# Patient Record
Sex: MALE | Race: WHITE | Employment: FULL TIME | URBAN - METROPOLITAN AREA
[De-identification: names, ages, dates, MRNs, and addresses within clinical notes are randomized per-mention and may not be internally consistent; named-entity substitution may affect disease eponyms.]

---

## 2019-03-30 ENCOUNTER — HOSPITAL ENCOUNTER (EMERGENCY)
Age: 49
Discharge: HOME | End: 2019-03-30
Payer: COMMERCIAL

## 2019-03-30 VITALS
OXYGEN SATURATION: 98 % | WEIGHT: 280 LBS | TEMPERATURE: 97.8 F | DIASTOLIC BLOOD PRESSURE: 90 MMHG | RESPIRATION RATE: 20 BRPM | HEART RATE: 67 BPM | SYSTOLIC BLOOD PRESSURE: 155 MMHG

## 2019-03-30 DIAGNOSIS — R42 VERTIGO: Primary | ICD-10-CM

## 2019-03-30 DIAGNOSIS — M43.6 TORTICOLLIS: ICD-10-CM

## 2019-03-30 DIAGNOSIS — H93.8X2 EAR PRESSURE, LEFT: ICD-10-CM

## 2019-03-30 PROCEDURE — 96372 THER/PROPH/DIAG INJ SC/IM: CPT

## 2019-03-30 PROCEDURE — 6360000002 HC RX W HCPCS: Performed by: NURSE PRACTITIONER

## 2019-03-30 PROCEDURE — 99212 OFFICE O/P EST SF 10 MIN: CPT

## 2019-03-30 RX ORDER — LORATADINE 10 MG/1
10 TABLET ORAL DAILY
Qty: 20 TABLET | Refills: 0 | Status: SHIPPED | OUTPATIENT
Start: 2019-03-30 | End: 2021-05-10 | Stop reason: ALTCHOICE

## 2019-03-30 RX ORDER — MECLIZINE HYDROCHLORIDE 25 MG/1
25 TABLET ORAL 3 TIMES DAILY PRN
Qty: 30 TABLET | Refills: 0 | Status: SHIPPED | OUTPATIENT
Start: 2019-03-30 | End: 2019-04-09

## 2019-03-30 RX ORDER — METHOCARBAMOL 500 MG/1
500 TABLET, FILM COATED ORAL 4 TIMES DAILY
Qty: 40 TABLET | Refills: 0 | Status: SHIPPED | OUTPATIENT
Start: 2019-03-30 | End: 2019-04-09

## 2019-03-30 RX ORDER — NAPROXEN 500 MG/1
500 TABLET ORAL 2 TIMES DAILY PRN
Qty: 28 TABLET | Refills: 0 | Status: SHIPPED | OUTPATIENT
Start: 2019-03-30 | End: 2021-05-10 | Stop reason: ALTCHOICE

## 2019-03-30 RX ORDER — FLUTICASONE PROPIONATE 50 MCG
1 SPRAY, SUSPENSION (ML) NASAL DAILY
Qty: 1 BOTTLE | Refills: 0 | Status: SHIPPED | OUTPATIENT
Start: 2019-03-30 | End: 2021-05-10 | Stop reason: ALTCHOICE

## 2019-03-30 RX ORDER — KETOROLAC TROMETHAMINE 30 MG/ML
30 INJECTION, SOLUTION INTRAMUSCULAR; INTRAVENOUS ONCE
Status: COMPLETED | OUTPATIENT
Start: 2019-03-30 | End: 2019-03-30

## 2019-03-30 RX ADMIN — KETOROLAC TROMETHAMINE 30 MG: 30 INJECTION, SOLUTION INTRAMUSCULAR at 15:27

## 2019-03-30 ASSESSMENT — PAIN DESCRIPTION - LOCATION: LOCATION: NECK

## 2019-03-30 ASSESSMENT — PAIN SCALES - GENERAL
PAINLEVEL_OUTOF10: 7
PAINLEVEL_OUTOF10: 7

## 2019-03-30 ASSESSMENT — PAIN DESCRIPTION - DESCRIPTORS: DESCRIPTORS: TIGHTNESS

## 2019-03-30 ASSESSMENT — PAIN DESCRIPTION - PAIN TYPE: TYPE: ACUTE PAIN

## 2019-03-30 ASSESSMENT — PAIN DESCRIPTION - ONSET: ONSET: GRADUAL

## 2019-03-30 ASSESSMENT — PAIN DESCRIPTION - PROGRESSION: CLINICAL_PROGRESSION: NOT CHANGED

## 2019-03-30 ASSESSMENT — PAIN DESCRIPTION - FREQUENCY: FREQUENCY: CONTINUOUS

## 2021-05-10 ENCOUNTER — APPOINTMENT (OUTPATIENT)
Dept: GENERAL RADIOLOGY | Age: 51
End: 2021-05-10
Payer: COMMERCIAL

## 2021-05-10 ENCOUNTER — HOSPITAL ENCOUNTER (EMERGENCY)
Age: 51
Discharge: HOME | End: 2021-05-10
Attending: EMERGENCY MEDICINE
Payer: COMMERCIAL

## 2021-05-10 VITALS
HEIGHT: 71 IN | RESPIRATION RATE: 18 BRPM | DIASTOLIC BLOOD PRESSURE: 89 MMHG | OXYGEN SATURATION: 97 % | BODY MASS INDEX: 39.2 KG/M2 | WEIGHT: 280 LBS | SYSTOLIC BLOOD PRESSURE: 138 MMHG | TEMPERATURE: 98.6 F | HEART RATE: 76 BPM

## 2021-05-10 DIAGNOSIS — S49.92XA INJURY OF LEFT SHOULDER, INITIAL ENCOUNTER: Primary | ICD-10-CM

## 2021-05-10 PROCEDURE — 6360000002 HC RX W HCPCS: Performed by: EMERGENCY MEDICINE

## 2021-05-10 PROCEDURE — 96372 THER/PROPH/DIAG INJ SC/IM: CPT

## 2021-05-10 PROCEDURE — 99285 EMERGENCY DEPT VISIT HI MDM: CPT

## 2021-05-10 PROCEDURE — 73030 X-RAY EXAM OF SHOULDER: CPT

## 2021-05-10 RX ORDER — NAPROXEN 500 MG/1
500 TABLET ORAL 2 TIMES DAILY PRN
Qty: 60 TABLET | Refills: 0 | Status: SHIPPED | OUTPATIENT
Start: 2021-05-10

## 2021-05-10 RX ORDER — FENTANYL CITRATE 50 UG/ML
100 INJECTION, SOLUTION INTRAMUSCULAR; INTRAVENOUS ONCE
Status: COMPLETED | OUTPATIENT
Start: 2021-05-10 | End: 2021-05-10

## 2021-05-10 RX ADMIN — FENTANYL CITRATE 100 MCG: 50 INJECTION, SOLUTION INTRAMUSCULAR; INTRAVENOUS at 08:17

## 2021-05-10 ASSESSMENT — PAIN SCALES - GENERAL
PAINLEVEL_OUTOF10: 9
PAINLEVEL_OUTOF10: 9

## 2021-05-10 ASSESSMENT — PAIN DESCRIPTION - FREQUENCY
FREQUENCY: CONTINUOUS
FREQUENCY: CONTINUOUS

## 2021-05-10 ASSESSMENT — PAIN DESCRIPTION - ORIENTATION
ORIENTATION: LEFT
ORIENTATION: LEFT

## 2021-05-10 ASSESSMENT — ENCOUNTER SYMPTOMS
BACK PAIN: 0
VOMITING: 0
ABDOMINAL PAIN: 0
SHORTNESS OF BREATH: 0
NAUSEA: 0

## 2021-05-10 ASSESSMENT — PAIN DESCRIPTION - LOCATION: LOCATION: SHOULDER

## 2021-05-10 NOTE — ED NOTES
Extra large arm sling applied to left shoulder, tolerated procedure without  Difficulty.       Jayleen Cummings, KURT  05/10/21 9287

## 2021-05-10 NOTE — ED PROVIDER NOTES
HPI   Patient is a 44 y.o. male with a past medical history of nothing, presenting to the Emergency Department for left shoulder pain. History obtained by patient. Symptoms are mild to moderate in severity and distant since onset. They are improved by nothing and worsened by movement. Patient states he slipped on water on his garage yesterday and landed on his left shoulder. He has had pain to the left shoulder since the fall. He attempted ibuprofen as well as heat at home with minimal relief. He states not improving today. He has never hurt the shoulder before. Denies any numbness or tingling. He has no elbow or wrist pain. He states is worse when he attempts to move the shoulder in any direction. He wanted to go to work today but states he cannot move his left shoulder which is what prompted evaluation. He has never seen orthopedics before. He states he did hit the front of his face on the ground but did not lose consciousness. He is on no blood thinning medication. He denies any blurry vision, changes in vision, numbness, tingling or weakness. Review of Systems   Constitutional: Negative for chills and fever. Respiratory: Negative for shortness of breath. Cardiovascular: Negative for chest pain. Gastrointestinal: Negative for abdominal pain, nausea and vomiting. Musculoskeletal: Positive for arthralgias and myalgias. Negative for back pain and neck stiffness. Skin: Positive for wound. Negative for rash. Neurological: Negative for numbness and headaches. All other systems reviewed and are negative. Physical Exam  Vitals signs and nursing note reviewed. Constitutional:       General: He is not in acute distress. Appearance: He is well-developed. He is not ill-appearing. HENT:      Head: Normocephalic. Comments: Small Abrasion of the anterior nasal bridge  Eyes:      Extraocular Movements: Extraocular movements intact.    Neck:      Musculoskeletal: Normal range of motion and neck supple. No muscular tenderness. Cardiovascular:      Rate and Rhythm: Normal rate and regular rhythm. Pulses: Normal pulses. Heart sounds: Normal heart sounds. No murmur. Pulmonary:      Effort: Pulmonary effort is normal. No respiratory distress. Breath sounds: Normal breath sounds. No wheezing or rales. Abdominal:      Palpations: Abdomen is soft. Tenderness: There is no abdominal tenderness. There is no guarding or rebound. Musculoskeletal:      Comments: Pain with left shoulder range of motion. With external rotation as well as abduction. No pain with adduction or internal rotation. No palpable step-offs or deformities. Minimal tenderness to palpation. Temple's are soft and compressible. Palpable radial pulses bilaterally. Neurovascularly in tact   Skin:     General: Skin is warm and dry. Neurological:      Mental Status: He is alert and oriented to person, place, and time. Cranial Nerves: No cranial nerve deficit. Coordination: Coordination normal.          Procedures     MDM   Patient presented to the Emergency Department for shoulder pain. They are clinically stable, vital signs stable, non toxic appearing. He is neurovascularly intact. Physical exam concerning for possible rotator cuff pathology. He has pain with range of motion testing. X-ray performed no evidence of acute fracture or dislocation. Patient's physical exam, rotator pathology is suspected. He was given pain medication in the department. He will be instructed on supportive care therapy at home as well as referral with orthopedics. Strict return precautions were discussed including but not limited too numbness, tingling, worsening pain, new or worsening symtpoms. They verbalized understanding and were agreeable with the plan. All questions were answered and patient was discharged.             --------------------------------------------- PAST HISTORY ---------------------------------------------  Past Medical History:  has no past medical history on file. Past Surgical History:  has a past surgical history that includes hernia repair. Social History:  reports that he has never smoked. He has never used smokeless tobacco. He reports current alcohol use. He reports that he does not use drugs. Family History: family history is not on file. The patients home medications have been reviewed. Allergies: Patient has no known allergies. -------------------------------------------------- RESULTS -------------------------------------------------  Labs:  No results found for this visit on 05/10/21. Radiology:  XR SHOULDER LEFT (MIN 2 VIEWS)   Final Result   No acute abnormality.               ------------------------- NURSING NOTES AND VITALS REVIEWED ---------------------------  Date / Time Roomed:  5/10/2021  7:44 AM  ED Bed Assignment:  05/05    The nursing notes within the ED encounter and vital signs as below have been reviewed. /89   Pulse 76   Temp 98.6 °F (37 °C) (Oral)   Resp 18   Ht 5' 11\" (1.803 m)   Wt 280 lb (127 kg)   SpO2 97%   BMI 39.05 kg/m²   Oxygen Saturation Interpretation: Normal      ------------------------------------------ PROGRESS NOTES ------------------------------------------  ED COURSE MEDICATIONS:                Medications   fentaNYL (SUBLIMAZE) injection 100 mcg (100 mcg Intramuscular Given 5/10/21 0808)       I have spoken with the patient and discussed todays results, in addition to providing specific details for the plan of care and counseling regarding the diagnosis and prognosis. Their questions are answered at this time and they are agreeable with the plan. I discussed at length with them reasons for immediate return here for re evaluation. They will followup with primary care by calling their office tomorrow.       --------------------------------- ADDITIONAL PROVIDER NOTES ---------------------------------  At this time the patient is without objective evidence of an acute process requiring hospitalization or inpatient management. They have remained hemodynamically stable throughout their entire ED visit and are stable for discharge with outpatient follow-up. The plan has been discussed in detail and they are aware of the specific conditions for emergent return, as well as the importance of follow-up. New Prescriptions    NAPROXEN (NAPROSYN) 500 MG TABLET    Take 1 tablet by mouth 2 times daily as needed for Pain       Diagnosis:  1. Injury of left shoulder, initial encounter        Disposition:  Patient's disposition: Discharge to home  Patient's condition is stable.         [address] [place] [address] DO Jackelin  Resident  05/10/21 5773

## 2023-05-31 ENCOUNTER — HOSPITAL ENCOUNTER (EMERGENCY)
Age: 53
Discharge: HOME | End: 2023-05-31
Payer: COMMERCIAL

## 2023-05-31 VITALS
SYSTOLIC BLOOD PRESSURE: 173 MMHG | WEIGHT: 270 LBS | BODY MASS INDEX: 37.8 KG/M2 | OXYGEN SATURATION: 98 % | RESPIRATION RATE: 18 BRPM | HEART RATE: 71 BPM | TEMPERATURE: 98.6 F | HEIGHT: 71 IN | DIASTOLIC BLOOD PRESSURE: 95 MMHG

## 2023-05-31 DIAGNOSIS — S05.02XA ABRASION OF LEFT CORNEA, INITIAL ENCOUNTER: Primary | ICD-10-CM

## 2023-05-31 PROCEDURE — 99211 OFF/OP EST MAY X REQ PHY/QHP: CPT

## 2023-05-31 RX ORDER — PURIFIED WATER 986 MG/ML
1 SOLUTION OPHTHALMIC PRN
Status: DISCONTINUED | OUTPATIENT
Start: 2023-05-31 | End: 2023-05-31 | Stop reason: HOSPADM

## 2023-05-31 RX ORDER — ERYTHROMYCIN 5 MG/G
OINTMENT OPHTHALMIC
Qty: 3.5 G | Refills: 0 | Status: SHIPPED | OUTPATIENT
Start: 2023-05-31 | End: 2023-06-10

## 2023-05-31 RX ORDER — TETRACAINE HYDROCHLORIDE 5 MG/ML
2 SOLUTION OPHTHALMIC ONCE
Status: DISCONTINUED | OUTPATIENT
Start: 2023-05-31 | End: 2023-05-31 | Stop reason: HOSPADM

## 2023-05-31 NOTE — ED PROVIDER NOTES
[address] ENCOUNTER        Pt Name: Austin Martinez  MRN: [id number]  [place] 1970  Date of evaluation: 5/31/2023  Provider: POLINA Reilly CNP  PCP: Jonatan Rutherford MD  Note Started: 8:51 AM EDT 5/31/23    CHIEF COMPLAINT       Chief Complaint   Patient presents with    Foreign Body in [address] [place] [address]: 1 or more Elements   History From: patient    Limitations to history : None    Austin Martinez is a 45 y.o. male who presents for evaluation. He said that he was putting a new countertop with his son on Monday which was 3 days ago and is needing something to have gotten to his he said they were cutting countertops and metal and things and he is not sure what time he had pain ever since he said his pain seem to be getting worse. He is denies any injury to his eye other than the possibility of a foreign body. The right eye is fine. Nursing Notes were all reviewed and agreed with or any disagreements were addressed in the HPI. REVIEW OF SYSTEMS :      Review of Systems    Positives and Pertinent negatives as per HPI. SURGICAL HISTORY     Past Surgical History:   Procedure Laterality Date    HERNIA REPAIR         CURRENTMEDICATIONS       Discharge Medication List as of 5/31/2023  8:32 AM        CONTINUE these medications which have NOT CHANGED    Details   naproxen (NAPROSYN) 500 MG tablet Take 1 tablet by mouth 2 times daily as needed for Pain, Disp-60 tablet, R-0Print             ALLERGIES     Patient has no known allergies. FAMILYHISTORY     History reviewed. No pertinent family history.      SOCIAL HISTORY       Social History     Tobacco Use    Smoking status: Never    Smokeless tobacco: Never   Vaping Use    Vaping Use: Never used   Substance Use Topics    Alcohol use: Yes     Comment: socially    Drug use: No       SCREENINGS                                  PHYSICAL EXAM  1 or more Elements     ED Triage

## 2025-04-06 ENCOUNTER — HOSPITAL ENCOUNTER (EMERGENCY)
Age: 55
Discharge: ANOTHER HOSPITAL | End: 2025-04-06
Attending: EMERGENCY MEDICINE
Payer: COMMERCIAL

## 2025-04-06 ENCOUNTER — HOSPITAL ENCOUNTER (INPATIENT)
Age: 55
LOS: 1 days | Discharge: HOME | DRG: 086 | End: 2025-04-07
Attending: EMERGENCY MEDICINE | Admitting: SURGERY
Payer: COMMERCIAL

## 2025-04-06 ENCOUNTER — APPOINTMENT (OUTPATIENT)
Dept: CT IMAGING | Age: 55
End: 2025-04-06
Payer: COMMERCIAL

## 2025-04-06 ENCOUNTER — APPOINTMENT (OUTPATIENT)
Dept: GENERAL RADIOLOGY | Age: 55
DRG: 086 | End: 2025-04-06
Payer: COMMERCIAL

## 2025-04-06 VITALS
RESPIRATION RATE: 16 BRPM | HEART RATE: 73 BPM | DIASTOLIC BLOOD PRESSURE: 72 MMHG | BODY MASS INDEX: 37.66 KG/M2 | OXYGEN SATURATION: 94 % | TEMPERATURE: 97 F | WEIGHT: 270 LBS | SYSTOLIC BLOOD PRESSURE: 127 MMHG

## 2025-04-06 DIAGNOSIS — S16.1XXA STRAIN OF NECK MUSCLE, INITIAL ENCOUNTER: ICD-10-CM

## 2025-04-06 DIAGNOSIS — I62.9 INTRACRANIAL BLEED (HCC): Primary | ICD-10-CM

## 2025-04-06 DIAGNOSIS — S06.5XAA SUBDURAL HEMATOMA (HCC): Primary | ICD-10-CM

## 2025-04-06 DIAGNOSIS — I61.9 INTRAPARENCHYMAL HEMORRHAGE OF BRAIN (HCC): ICD-10-CM

## 2025-04-06 LAB
ALBUMIN SERPL-MCNC: 4.1 G/DL (ref 3.5–5.2)
ALP SERPL-CCNC: 61 U/L (ref 40–129)
ALT SERPL-CCNC: 26 U/L (ref 0–40)
ANION GAP SERPL CALCULATED.3IONS-SCNC: 15 MMOL/L (ref 7–16)
AST SERPL-CCNC: 18 U/L (ref 0–39)
BASOPHILS # BLD: 0.07 K/UL (ref 0–0.2)
BASOPHILS NFR BLD: 1 % (ref 0–2)
BILIRUB SERPL-MCNC: 0.2 MG/DL (ref 0–1.2)
BUN SERPL-MCNC: 13 MG/DL (ref 6–20)
CALCIUM SERPL-MCNC: 8.9 MG/DL (ref 8.6–10.2)
CHLORIDE SERPL-SCNC: 94 MMOL/L (ref 98–107)
CO2 SERPL-SCNC: 21 MMOL/L (ref 22–29)
CREAT SERPL-MCNC: 0.9 MG/DL (ref 0.7–1.2)
EOSINOPHIL # BLD: 0.1 K/UL (ref 0.05–0.5)
EOSINOPHILS RELATIVE PERCENT: 1 % (ref 0–6)
ERYTHROCYTE [DISTWIDTH] IN BLOOD BY AUTOMATED COUNT: 14.2 % (ref 11.5–15)
GFR, ESTIMATED: >90 ML/MIN/1.73M2
GLUCOSE SERPL-MCNC: 164 MG/DL (ref 74–99)
HCT VFR BLD AUTO: 43.8 % (ref 37–54)
HGB BLD-MCNC: 14.9 G/DL (ref 12.5–16.5)
IMM GRANULOCYTES # BLD AUTO: 0.06 K/UL (ref 0–0.58)
IMM GRANULOCYTES NFR BLD: 1 % (ref 0–5)
INR PPP: 1
LYMPHOCYTES NFR BLD: 2.84 K/UL (ref 1.5–4)
LYMPHOCYTES RELATIVE PERCENT: 32 % (ref 20–42)
MCH RBC QN AUTO: 30.7 PG (ref 26–35)
MCHC RBC AUTO-ENTMCNC: 34 G/DL (ref 32–34.5)
MCV RBC AUTO: 90.3 FL (ref 80–99.9)
MONOCYTES NFR BLD: 0.58 K/UL (ref 0.1–0.95)
MONOCYTES NFR BLD: 7 % (ref 2–12)
NEUTROPHILS NFR BLD: 58 % (ref 43–80)
NEUTS SEG NFR BLD: 5.12 K/UL (ref 1.8–7.3)
PARTIAL THROMBOPLASTIN TIME: 33.5 SEC (ref 24.5–35.1)
PLATELET # BLD AUTO: 239 K/UL (ref 130–450)
PMV BLD AUTO: 10 FL (ref 7–12)
POTASSIUM SERPL-SCNC: 4.4 MMOL/L (ref 3.5–5)
PROT SERPL-MCNC: 7.1 G/DL (ref 6.4–8.3)
PROTHROMBIN TIME: 10.7 SEC (ref 9.3–12.4)
RBC # BLD AUTO: 4.85 M/UL (ref 3.8–5.8)
SODIUM SERPL-SCNC: 130 MMOL/L (ref 132–146)
WBC OTHER # BLD: 8.8 K/UL (ref 4.5–11.5)

## 2025-04-06 PROCEDURE — 90471 IMMUNIZATION ADMIN: CPT | Performed by: PHYSICIAN ASSISTANT

## 2025-04-06 PROCEDURE — 80179 DRUG ASSAY SALICYLATE: CPT

## 2025-04-06 PROCEDURE — 80307 DRUG TEST PRSMV CHEM ANLYZR: CPT

## 2025-04-06 PROCEDURE — 6360000002 HC RX W HCPCS: Performed by: PHYSICIAN ASSISTANT

## 2025-04-06 PROCEDURE — 99285 EMERGENCY DEPT VISIT HI MDM: CPT

## 2025-04-06 PROCEDURE — 71045 X-RAY EXAM CHEST 1 VIEW: CPT

## 2025-04-06 PROCEDURE — 80143 DRUG ASSAY ACETAMINOPHEN: CPT

## 2025-04-06 PROCEDURE — 90714 TD VACC NO PRESV 7 YRS+ IM: CPT | Performed by: PHYSICIAN ASSISTANT

## 2025-04-06 PROCEDURE — 6370000000 HC RX 637 (ALT 250 FOR IP): Performed by: PHYSICIAN ASSISTANT

## 2025-04-06 PROCEDURE — 80053 COMPREHEN METABOLIC PANEL: CPT

## 2025-04-06 PROCEDURE — 85730 THROMBOPLASTIN TIME PARTIAL: CPT

## 2025-04-06 PROCEDURE — G0480 DRUG TEST DEF 1-7 CLASSES: HCPCS

## 2025-04-06 PROCEDURE — 85610 PROTHROMBIN TIME: CPT

## 2025-04-06 PROCEDURE — 72170 X-RAY EXAM OF PELVIS: CPT

## 2025-04-06 PROCEDURE — 70450 CT HEAD/BRAIN W/O DYE: CPT

## 2025-04-06 PROCEDURE — 72125 CT NECK SPINE W/O DYE: CPT

## 2025-04-06 PROCEDURE — 85025 COMPLETE CBC W/AUTO DIFF WBC: CPT

## 2025-04-06 RX ORDER — SODIUM CHLORIDE 9 MG/ML
INJECTION, SOLUTION INTRAVENOUS CONTINUOUS
Status: DISCONTINUED | OUTPATIENT
Start: 2025-04-06 | End: 2025-04-07

## 2025-04-06 RX ORDER — ACETAMINOPHEN 325 MG/1
650 TABLET ORAL ONCE
Status: COMPLETED | OUTPATIENT
Start: 2025-04-06 | End: 2025-04-06

## 2025-04-06 RX ADMIN — ACETAMINOPHEN 650 MG: 325 TABLET ORAL at 19:39

## 2025-04-06 RX ADMIN — CLOSTRIDIUM TETANI TOXOID ANTIGEN (FORMALDEHYDE INACTIVATED) AND CORYNEBACTERIUM DIPHTHERIAE TOXOID ANTIGEN (FORMALDEHYDE INACTIVATED) 0.5 ML: 5; 2 INJECTION, SUSPENSION INTRAMUSCULAR at 19:42

## 2025-04-06 ASSESSMENT — LIFESTYLE VARIABLES
HOW MANY STANDARD DRINKS CONTAINING ALCOHOL DO YOU HAVE ON A TYPICAL DAY: 5 OR 6
HOW OFTEN DO YOU HAVE A DRINK CONTAINING ALCOHOL: 2-4 TIMES A MONTH
HOW OFTEN DO YOU HAVE A DRINK CONTAINING ALCOHOL: 2-3 TIMES A WEEK

## 2025-04-06 ASSESSMENT — PAIN SCALES - GENERAL
PAINLEVEL_OUTOF10: 2
PAINLEVEL_OUTOF10: 5

## 2025-04-06 ASSESSMENT — PAIN - FUNCTIONAL ASSESSMENT
PAIN_FUNCTIONAL_ASSESSMENT: 0-10
PAIN_FUNCTIONAL_ASSESSMENT: NONE - DENIES PAIN

## 2025-04-06 ASSESSMENT — PAIN DESCRIPTION - DESCRIPTORS: DESCRIPTORS: ACHING

## 2025-04-06 ASSESSMENT — PAIN DESCRIPTION - LOCATION
LOCATION: HEAD
LOCATION: HEAD

## 2025-04-06 ASSESSMENT — PAIN DESCRIPTION - ORIENTATION: ORIENTATION: POSTERIOR

## 2025-04-06 NOTE — ED PROVIDER NOTES
Shared ASHLEY-ED Attending Visit.  CC: Yes .      HPI:  4/6/25, Time: 6:49 PM EDT         Kraig Magaña is a 54 y.o. male presenting to the ED for fall, beginning prior to arrival ago.  The complaint has been persistent, moderate in severity, and worsened by nothing.   Patient comes in with complaint of fall.  Patient is intoxicated he states he had about 10-12 beers.  He was in the shower states he looked up lost his balance falling back hitting his head.  He denies any loss of consciousness.  Patient is not on any blood thinners.  He denies any neck pain.  Patient denies any chest pain abdominal pain.  No upper or lower extremity pain.  No numbness tingling weakness of his extremities.  Review of Systems:   A complete review of systems was performed and pertinent positives and negatives are stated within HPI, all other systems reviewed and are negative.          --------------------------------------------- PAST HISTORY ---------------------------------------------  Past Medical History:  has no past medical history on file.    Past Surgical History:  has a past surgical history that includes hernia repair.    Social History:  reports that he has never smoked. He has never used smokeless tobacco. He reports current alcohol use. He reports that he does not use drugs.    Family History: family history is not on file.     The patient’s home medications have been reviewed.    Allergies: Patient has no known allergies.    -------------------------------------------------- RESULTS -------------------------------------------------  All laboratory and radiology results have been personally reviewed by myself   LABS:  Results for orders placed or performed during the hospital encounter of 04/06/25   CBC with Auto Differential   Result Value Ref Range    WBC 8.8 4.5 - 11.5 k/uL    RBC 4.85 3.80 - 5.80 m/uL    Hemoglobin 14.9 12.5 - 16.5 g/dL    Hematocrit 43.8 37.0 - 54.0 %    MCV 90.3 80.0 - 99.9 fL    MCH 30.7 26.0 - 35.0

## 2025-04-07 ENCOUNTER — APPOINTMENT (OUTPATIENT)
Dept: CT IMAGING | Age: 55
DRG: 086 | End: 2025-04-07
Payer: COMMERCIAL

## 2025-04-07 VITALS
RESPIRATION RATE: 20 BRPM | WEIGHT: 270 LBS | SYSTOLIC BLOOD PRESSURE: 146 MMHG | BODY MASS INDEX: 37.8 KG/M2 | TEMPERATURE: 98.1 F | OXYGEN SATURATION: 97 % | HEART RATE: 97 BPM | DIASTOLIC BLOOD PRESSURE: 88 MMHG | HEIGHT: 71 IN

## 2025-04-07 PROBLEM — S06.5XAA SUBDURAL HEMATOMA (HCC): Status: ACTIVE | Noted: 2025-04-07

## 2025-04-07 LAB
AMPHET UR QL SCN: NEGATIVE
ANION GAP SERPL CALCULATED.3IONS-SCNC: 14 MMOL/L (ref 7–16)
APAP SERPL-MCNC: <5 UG/ML (ref 10–30)
BARBITURATES UR QL SCN: NEGATIVE
BASOPHILS # BLD: 0.07 K/UL (ref 0–0.2)
BASOPHILS NFR BLD: 1 % (ref 0–2)
BENZODIAZ UR QL: NEGATIVE
BUN SERPL-MCNC: 11 MG/DL (ref 6–20)
BUPRENORPHINE UR QL: NEGATIVE
CALCIUM SERPL-MCNC: 9 MG/DL (ref 8.6–10.2)
CANNABINOIDS UR QL SCN: NEGATIVE
CHLORIDE SERPL-SCNC: 104 MMOL/L (ref 98–107)
CO2 SERPL-SCNC: 19 MMOL/L (ref 22–29)
COCAINE UR QL SCN: NEGATIVE
CREAT SERPL-MCNC: 0.8 MG/DL (ref 0.7–1.2)
EOSINOPHIL # BLD: 0.1 K/UL (ref 0.05–0.5)
EOSINOPHILS RELATIVE PERCENT: 1 % (ref 0–6)
ERYTHROCYTE [DISTWIDTH] IN BLOOD BY AUTOMATED COUNT: 14.3 % (ref 11.5–15)
ETHANOLAMINE SERPL-MCNC: 179 MG/DL (ref 0–0.08)
ETHANOLAMINE SERPL-MCNC: <10 MG/DL (ref 0–0.08)
FENTANYL UR QL: NEGATIVE
GFR, ESTIMATED: >90 ML/MIN/1.73M2
GLUCOSE SERPL-MCNC: 140 MG/DL (ref 74–99)
HCT VFR BLD AUTO: 45.1 % (ref 37–54)
HGB BLD-MCNC: 15.1 G/DL (ref 12.5–16.5)
IMM GRANULOCYTES # BLD AUTO: 0.07 K/UL (ref 0–0.58)
IMM GRANULOCYTES NFR BLD: 1 % (ref 0–5)
LYMPHOCYTES NFR BLD: 2.33 K/UL (ref 1.5–4)
LYMPHOCYTES RELATIVE PERCENT: 25 % (ref 20–42)
MCH RBC QN AUTO: 30.8 PG (ref 26–35)
MCHC RBC AUTO-ENTMCNC: 33.5 G/DL (ref 32–34.5)
MCV RBC AUTO: 91.9 FL (ref 80–99.9)
METHADONE UR QL: NEGATIVE
MONOCYTES NFR BLD: 0.86 K/UL (ref 0.1–0.95)
MONOCYTES NFR BLD: 9 % (ref 2–12)
NEUTROPHILS NFR BLD: 63 % (ref 43–80)
NEUTS SEG NFR BLD: 5.88 K/UL (ref 1.8–7.3)
OPIATES UR QL SCN: NEGATIVE
OXYCODONE UR QL SCN: NEGATIVE
PCP UR QL SCN: NEGATIVE
PLATELET # BLD AUTO: 239 K/UL (ref 130–450)
PMV BLD AUTO: 10.4 FL (ref 7–12)
POTASSIUM SERPL-SCNC: 4.6 MMOL/L (ref 3.5–5)
RBC # BLD AUTO: 4.91 M/UL (ref 3.8–5.8)
SALICYLATES SERPL-MCNC: <0.3 MG/DL (ref 0–30)
SODIUM SERPL-SCNC: 137 MMOL/L (ref 132–146)
TEST INFORMATION: NORMAL
TOXIC TRICYCLIC SC,BLOOD: NEGATIVE
WBC OTHER # BLD: 9.3 K/UL (ref 4.5–11.5)

## 2025-04-07 PROCEDURE — 97161 PT EVAL LOW COMPLEX 20 MIN: CPT

## 2025-04-07 PROCEDURE — 99232 SBSQ HOSP IP/OBS MODERATE 35: CPT | Performed by: STUDENT IN AN ORGANIZED HEALTH CARE EDUCATION/TRAINING PROGRAM

## 2025-04-07 PROCEDURE — 6370000000 HC RX 637 (ALT 250 FOR IP)

## 2025-04-07 PROCEDURE — 36415 COLL VENOUS BLD VENIPUNCTURE: CPT

## 2025-04-07 PROCEDURE — 80048 BASIC METABOLIC PNL TOTAL CA: CPT

## 2025-04-07 PROCEDURE — 2060000000 HC ICU INTERMEDIATE R&B

## 2025-04-07 PROCEDURE — 97165 OT EVAL LOW COMPLEX 30 MIN: CPT

## 2025-04-07 PROCEDURE — 70450 CT HEAD/BRAIN W/O DYE: CPT

## 2025-04-07 PROCEDURE — 85025 COMPLETE CBC W/AUTO DIFF WBC: CPT

## 2025-04-07 PROCEDURE — 2580000003 HC RX 258

## 2025-04-07 PROCEDURE — 2500000003 HC RX 250 WO HCPCS

## 2025-04-07 PROCEDURE — 80307 DRUG TEST PRSMV CHEM ANLYZR: CPT

## 2025-04-07 PROCEDURE — G0480 DRUG TEST DEF 1-7 CLASSES: HCPCS

## 2025-04-07 RX ORDER — LABETALOL HYDROCHLORIDE 5 MG/ML
10 INJECTION, SOLUTION INTRAVENOUS EVERY 4 HOURS PRN
Status: DISCONTINUED | OUTPATIENT
Start: 2025-04-07 | End: 2025-04-07 | Stop reason: HOSPADM

## 2025-04-07 RX ORDER — HYDRALAZINE HYDROCHLORIDE 20 MG/ML
10 INJECTION INTRAMUSCULAR; INTRAVENOUS EVERY 4 HOURS PRN
Status: DISCONTINUED | OUTPATIENT
Start: 2025-04-07 | End: 2025-04-07 | Stop reason: HOSPADM

## 2025-04-07 RX ORDER — POLYETHYLENE GLYCOL 3350 17 G/17G
17 POWDER, FOR SOLUTION ORAL DAILY PRN
Status: DISCONTINUED | OUTPATIENT
Start: 2025-04-07 | End: 2025-04-07

## 2025-04-07 RX ORDER — LEVETIRACETAM 500 MG/1
500 TABLET ORAL 2 TIMES DAILY
Qty: 12 TABLET | Refills: 0 | Status: SHIPPED | OUTPATIENT
Start: 2025-04-08 | End: 2025-04-15

## 2025-04-07 RX ORDER — LEVETIRACETAM 500 MG/1
500 TABLET ORAL 2 TIMES DAILY
Status: DISCONTINUED | OUTPATIENT
Start: 2025-04-07 | End: 2025-04-07 | Stop reason: HOSPADM

## 2025-04-07 RX ORDER — LEVETIRACETAM 500 MG/1
500 TABLET ORAL 2 TIMES DAILY
Qty: 12 TABLET | Refills: 0 | Status: SHIPPED | OUTPATIENT
Start: 2025-04-08 | End: 2025-04-07

## 2025-04-07 RX ORDER — ONDANSETRON 4 MG/1
4 TABLET, ORALLY DISINTEGRATING ORAL EVERY 8 HOURS PRN
Status: DISCONTINUED | OUTPATIENT
Start: 2025-04-07 | End: 2025-04-07 | Stop reason: HOSPADM

## 2025-04-07 RX ORDER — ACETAMINOPHEN 325 MG/1
650 TABLET ORAL EVERY 4 HOURS PRN
Status: DISCONTINUED | OUTPATIENT
Start: 2025-04-07 | End: 2025-04-07 | Stop reason: HOSPADM

## 2025-04-07 RX ORDER — LANOLIN ALCOHOL/MO/W.PET/CERES
100 CREAM (GRAM) TOPICAL DAILY
Status: DISCONTINUED | OUTPATIENT
Start: 2025-04-07 | End: 2025-04-07 | Stop reason: HOSPADM

## 2025-04-07 RX ORDER — BACITRACIN ZINC 500 [USP'U]/G
OINTMENT TOPICAL
Qty: 30 G | Refills: 1 | Status: SHIPPED | OUTPATIENT
Start: 2025-04-07 | End: 2025-04-15

## 2025-04-07 RX ORDER — ONDANSETRON 2 MG/ML
4 INJECTION INTRAMUSCULAR; INTRAVENOUS EVERY 6 HOURS PRN
Status: DISCONTINUED | OUTPATIENT
Start: 2025-04-07 | End: 2025-04-07 | Stop reason: HOSPADM

## 2025-04-07 RX ORDER — SODIUM CHLORIDE 9 MG/ML
INJECTION, SOLUTION INTRAVENOUS PRN
Status: DISCONTINUED | OUTPATIENT
Start: 2025-04-07 | End: 2025-04-07 | Stop reason: HOSPADM

## 2025-04-07 RX ORDER — POLYETHYLENE GLYCOL 3350 17 G/17G
17 POWDER, FOR SOLUTION ORAL DAILY
Status: DISCONTINUED | OUTPATIENT
Start: 2025-04-07 | End: 2025-04-07 | Stop reason: HOSPADM

## 2025-04-07 RX ORDER — BACITRACIN ZINC 500 [USP'U]/G
OINTMENT TOPICAL
Qty: 30 G | Refills: 1 | Status: SHIPPED | OUTPATIENT
Start: 2025-04-07 | End: 2025-04-07

## 2025-04-07 RX ORDER — FOLIC ACID 1 MG/1
1 TABLET ORAL DAILY
Status: DISCONTINUED | OUTPATIENT
Start: 2025-04-07 | End: 2025-04-07 | Stop reason: HOSPADM

## 2025-04-07 RX ORDER — BACITRACIN ZINC 500 [USP'U]/G
OINTMENT TOPICAL 3 TIMES DAILY
Status: DISCONTINUED | OUTPATIENT
Start: 2025-04-07 | End: 2025-04-07 | Stop reason: HOSPADM

## 2025-04-07 RX ORDER — SODIUM CHLORIDE 0.9 % (FLUSH) 0.9 %
10 SYRINGE (ML) INJECTION EVERY 12 HOURS SCHEDULED
Status: DISCONTINUED | OUTPATIENT
Start: 2025-04-07 | End: 2025-04-07 | Stop reason: HOSPADM

## 2025-04-07 RX ORDER — SODIUM CHLORIDE 0.9 % (FLUSH) 0.9 %
10 SYRINGE (ML) INJECTION PRN
Status: DISCONTINUED | OUTPATIENT
Start: 2025-04-07 | End: 2025-04-07 | Stop reason: HOSPADM

## 2025-04-07 RX ADMIN — SODIUM CHLORIDE, PRESERVATIVE FREE 10 ML: 5 INJECTION INTRAVENOUS at 08:06

## 2025-04-07 RX ADMIN — FOLIC ACID 1 MG: 1 TABLET ORAL at 08:06

## 2025-04-07 RX ADMIN — LEVETIRACETAM 500 MG: 500 TABLET, FILM COATED ORAL at 08:05

## 2025-04-07 RX ADMIN — Medication 100 MG: at 08:06

## 2025-04-07 RX ADMIN — SODIUM CHLORIDE: 0.9 INJECTION, SOLUTION INTRAVENOUS at 01:06

## 2025-04-07 RX ADMIN — BACITRACIN ZINC: 500 OINTMENT TOPICAL at 08:06

## 2025-04-07 RX ADMIN — LEVETIRACETAM 500 MG: 500 TABLET, FILM COATED ORAL at 01:01

## 2025-04-07 ASSESSMENT — LIFESTYLE VARIABLES
HOW MANY STANDARD DRINKS CONTAINING ALCOHOL DO YOU HAVE ON A TYPICAL DAY: 5 OR 6
HOW OFTEN DO YOU HAVE A DRINK CONTAINING ALCOHOL: 2-4 TIMES A MONTH

## 2025-04-07 ASSESSMENT — PAIN SCALES - GENERAL
PAINLEVEL_OUTOF10: 0
PAINLEVEL_OUTOF10: 0

## 2025-04-07 NOTE — ED PROVIDER NOTES
HPI:  4/6/25, Time: 10:53 PM EDT         Kraig Magaña is a 54 y.o. male presenting to the ED for fall, beginning hours ago.  The complaint has been persistent, moderate in severity, and worsened by nothing.  Patient presenting here because of fall.  Patient reports he was drinking today with his friends he went home he was in the shower he tipped his neck back and he fell backwards he did strike his head he reports no LOC he was seen at outlying facility and was diagnosed with subdural hematoma he is sent here for trauma service to see patient reporting no chest pain no difficulty breathing reports no numbness or tingling reports no headache or vomiting    ROS:   Pertinent positives and negatives are stated within HPI, all other systems reviewed and are negative.  --------------------------------------------- PAST HISTORY ---------------------------------------------  Past Medical History:  has no past medical history on file.    Past Surgical History:  has a past surgical history that includes hernia repair.    Social History:  reports that he has never smoked. He has never used smokeless tobacco. He reports current alcohol use. He reports that he does not use drugs.    Family History: family history is not on file.     The patient’s home medications have been reviewed.    Allergies: Patient has no known allergies.    ---------------------------------------------------PHYSICAL EXAM--------------------------------------    Constitutional/General: Alert and oriented x3, well appearing, non toxic in NAD  Head: Normocephalic noted staples to left posterior scalp  Eyes: PERRL, EOMI  Mouth: Oropharynx clear, handling secretions, no trismus  Neck: Supple, full ROM, non tender to palpation in the midline, no stridor, no crepitus, no meningeal signs  Pulmonary: Lungs clear to auscultation bilaterally, no wheezes, rales, or rhonchi. Not in respiratory distress  Cardiovascular:  Regular rate. Regular rhythm. No murmurs,  physicial eaxmination    This patient has been closely monitored during their ED course.    Counseling:   The emergency provider has spoken with the patient and discussed today’s results, in addition to providing specific details for the plan of care and counseling regarding the diagnosis and prognosis.  Questions are answered at this time and they are agreeable with the plan.       --------------------------------- IMPRESSION AND DISPOSITION ---------------------------------    IMPRESSION  1. Subdural hematoma (HCC)    2. Intraparenchymal hemorrhage of brain (HCC)        DISPOSITION  Disposition: Admit  Patient condition is fair        NOTE: This report was transcribed using voice recognition software. Every effort was made to ensure accuracy; however, inadvertent computerized transcription errors may be present          Aristeo Gonzalez MD  04/06/25 6744       Aristeo Gonzalez MD  04/06/25 4164

## 2025-04-07 NOTE — CARE COORDINATION
Here after a fall alcohol lvl 179 on admission  .ct head- 2 mm right mid and posterior parafalcine subdural hematoma.High right convexity intraparenchymal hemorrhage.Left posterior parietal scalp hematoma and laceration. Neurosurgery consult- can be discharged from a neurosurgery standpoint no driving continue Kiara will need a head CT no contrast in 2 weeks and see me in the office no working no straining avoid any circumstances where he could sustain trauma    PT 24  Met with patient to discuss role/transition of care. SBI completed and pr denies having drug or alcohol problem and chin not want resources or to speak  with peer recovery. He does get drug tested for his work. He lives in 1 story home with his wife and kids.  His Pcp is Dr Johnson and he uses [facility]. No DME, HHC or SNF history. His plan is home and he will call for a ride.Electronically signed by Deloris Strickland RN on 4/7/2025 at 1:07 PM      Case Management Assessment  Initial Evaluation    Date/Time of Evaluation: 4/7/2025 1:09 PM  Assessment Completed by: Deloris Strickland RN    If patient is discharged prior to next notation, then this note serves as note for discharge by case management.    Patient Name: Kraig Mgaaña                   YOB: 1970  Diagnosis: Subdural hematoma (HCC) [S06.5XAA]  Intraparenchymal hemorrhage of brain (HCC) [I61.9]                   Date / Time: 4/6/2025 10:53 PM    Patient Admission Status: Inpatient   Readmission Risk (Low < 19, Mod (19-27), High > 27): Readmission Risk Score: 4.8    Current PCP: Danyel Huffman MD  PCP verified by ? Yes    Chart Reviewed: Yes      History Provided by: Patient  Patient Orientation: Alert and Oriented    Patient Cognition: Alert    Hospitalization in the last 30 days (Readmission):  No    If yes, Readmission Assessment in  Navigator will be completed.    Advance Directives:      Code Status: Full Code   Patient's Primary Decision Maker  patient's individualized plan of care/goals and shares the quality data associated with the providers was provided to:     Patient Representative Name:   wife Taylor    The Patient and/or Patient Representative Agree with the Discharge Plan?  home    Deloris Strickland RN  Case Management Department  Ph: [phone]  Fax: [phone]

## 2025-04-07 NOTE — DISCHARGE INSTRUCTIONS
[facility] DISCHARGE INSTRUCTIONS    Call [phone], option 2, for any questions/concerns and for follow-up appointment in 1 week(s).    Please follow the instructions checked below:    Please follow-up with your primary care provider.    ACTIVITY INSTRUCTIONS  Increase activity as tolerated  No heavy lifting or strenuous activity  Take your incentive spirometer home and use 4-6 times/day   [x]  No driving until cleared by neurosurgery/trauma clinic    WOUND/DRESSING INSTRUCTIONS:  You may shower.  No sitting in bath tub, hot tub or swimming until cleared by physician.  Ice to areas of pain for first 24 hours.  Heat to areas of pain after that.  Wash areas of lacerations/abrasions with soap & water.  Rinse well.  Pat dry with clean towel.  Apply thin layer of Bacitracin, Neosporin, or triple antibiotic cream to affected area 2-3 times per day.  Keep wounds clean and dry.   [x]  Sutures/Staples are to be removed in 2 week(s). (4/20/2025)    MEDICATION INSTRUCTIONS  Take medication as prescribed.  When taking pain medications, you may experience dizziness or drowsiness.  Do not drink alcohol or drive when taking these medications.  You may experience constipation while taking pain medication.  You may take over the counter stool softeners such as docusate (Colace), sennosides S (Senokot-S), or Miralax.   []  You may take Ibuprofen (over the counter) as directed for mild pain.     --You may take up to 800mg every 8 hours for pain, please take with food or milk.   [x]  You may take acetaminophen (Tylenol) products.  Do NOT take more than 4000mg of Tylenol in 24h.   [x]  Do not take any other acetaminophen (Tylenol) products if you are taking Percocet or Norco, as these contain Tylenol.   --Do NOT take more than 4000mg of Tylenol in 24h.    OPIOID MEDICATION INSTRUCTIONS  Read the medication guide that is included with your prescription.  Take your medication exactly as prescribed.  Store medication away from

## 2025-04-07 NOTE — PLAN OF CARE
Problem: Discharge Planning  Goal: Discharge to home or other facility with appropriate resources  4/7/2025 1246 by Musa Pereira RN  Outcome: Completed  4/7/2025 0919 by Musa Pereira RN  Outcome: Progressing     Problem: Safety - Adult  Goal: Free from fall injury  4/7/2025 1246 by Musa Pereira RN  Outcome: Completed  4/7/2025 0919 by Musa Pereira RN  Outcome: Progressing

## 2025-04-07 NOTE — CONSULTS
Strain: Not on file   Food Insecurity: No Food Insecurity (4/7/2025)    Hunger Vital Sign     Worried About Running Out of Food in the Last Year: Never true     Ran Out of Food in the Last Year: Never true   Transportation Needs: No Transportation Needs (4/7/2025)    PRAPARE - Transportation     Lack of Transportation (Medical): No     Lack of Transportation (Non-Medical): No   Physical Activity: Not on file   Stress: Not on file   Social Connections: Not on file   Intimate Partner Violence: Not on file   Housing Stability: Low Risk  (4/7/2025)    Housing Stability Vital Sign     Unable to Pay for Housing in the Last Year: No     Number of Times Moved in the Last Year: 0     Homeless in the Last Year: No     History reviewed. No pertinent family history.    ROS: Complete 10 point ROS was obtained with positives in HPI, otherwise:  Pt denies fevers, denies chills.  Pt denies chest pain, denies SOB, denies nausea, denies vomiting, denies headache.      VITALS/DRAINS:   VITALS:  BP (!) 146/88   Pulse 97   Temp 98.1 °F (36.7 °C) (Oral)   Resp 20   Ht 1.804 m (5' 11.02\")   Wt 122.5 kg (270 lb)   SpO2 97%   BMI 37.63 kg/m²   24HR INTAKE/OUTPUT:    Intake/Output Summary (Last 24 hours) at 4/7/2025 1127  Last data filed at 4/7/2025 0604  Gross per 24 hour   Intake --   Output 100 ml   Net -100 ml       EXAMINATION:  Cranial Nerves:  I: smell NA   II: visual acuity  NA   II: visual fields Full to confrontation   II: pupils LEONEL   III,VII: ptosis None   III,IV,VI: extraocular muscles  Full ROM   V: mastication Normal   V: facial light touch sensation  Normal   V,VII: corneal reflex      VII: facial muscle function - upper  Normal   VII: facial muscle function - lower Normal   VIII: hearing Normal   IX: soft palate elevation  Normal   IX,X: gag reflex     XI: trapezius strength  5/5   XI: sternocleidomastoid strength 5/5   XI: neck extension strength  5/5   XII: tongue strength  Normal  acute body wall soft tissue abnormalities. Osseous thorax intact.     No acute cardiopulmonary process.     XR PELVIS (1-2 VIEWS)  Result Date: 4/6/2025  EXAMINATION: ONE XRAY VIEW OF THE PELVIS 4/6/2025 11:40 pm COMPARISON: None. HISTORY: ORDERING SYSTEM PROVIDED HISTORY: trauma TECHNOLOGIST PROVIDED HISTORY: Reason for exam:->trauma FINDINGS: No fracture, dislocation or osseous lesion.  No significant degenerative change.  Soft tissues unremarkable.     No fracture or dislocation.     CT HEAD WO CONTRAST  Addendum Date: 4/6/2025  ADDENDUM: Findings discussed with TAYE Farias 04/05/2025 at 8:59 p.m..     Result Date: 4/6/2025  EXAMINATION: CT OF THE HEAD WITHOUT CONTRAST  4/6/2025 8:23 pm TECHNIQUE: CT of the head was performed without the administration of intravenous contrast. Automated exposure control, iterative reconstruction, and/or weight based adjustment of the mA/kV was utilized to reduce the radiation dose to as low as reasonably achievable. COMPARISON: None. HISTORY: ORDERING SYSTEM PROVIDED HISTORY: injury TECHNOLOGIST PROVIDED HISTORY: Reason for exam:->injury Has a \"code stroke\" or \"stroke alert\" been called?->No Decision Support Exception - unselect if not a suspected or confirmed emergency medical condition->Emergency Medical Condition (MA) FINDINGS: BRAIN/VENTRICLES: There is no acute intracranial hemorrhage, mass effect or midline shift.  No abnormal extra-axial 2 mm right mid and posterior parafalcine subdural hematoma.  High right convexity intraparenchymal hemorrhage..  The gray-white differentiation is maintained without evidence of an acute infarct.  There is no evidence of hydrocephalus. ORBITS: The visualized portion of the orbits demonstrate no acute abnormality. SINUSES: The visualized paranasal sinuses and mastoid air cells demonstrate no acute abnormality. SOFT TISSUES/SKULL:  No acute fractures.  Left posterior parietal scalp hematoma and laceration.     1. 2 mm right mid and posterior

## 2025-04-07 NOTE — H&P
TRAUMA HISTORY & PHYSICAL  ADULT  Attending/Surgical Resident/Advance Practice Nurse  4/7/2025  12:30 AM    CHIEF COMPLAINT:  Trauma consult.      PRIMARY SURVEY    54 y.o. male Fall GLF.  Mechanical  Injury occurred Prior to arrival. Initially presented to [facility]. Patient states he had \"a few beers and then a few more\" and he fell out of the shower while washing his hair hitting his head on the bathroom floor. Denies loss of consciousness. CT head demonstrated a 2mm right subdural hematoma and a right intraparenchymal hemorrhage. He is not on any blood thinners. He also had a posterior scalp laceration which was repaired with staples at outside facility. He was transferred to [facility] for trauma and neurosurgery evaluation. Patient reports feeling well currently.     Loss of consciousness:  No    AIRWAY:   Airway  patent     EMS ETT Absent  Noisy respirations Absent  Retractions: Absent  Vomiting/bleeding: Absent    BREATHING:    Midaxillary breath sound left:  Normal  Midaxillary breath sound right:  Normal    FiO2:  Room air    CIRCULATION:   Femeral pulse intensity: Strong    Vitals:    04/06/25 2254 04/06/25 2302   BP: (!) 146/83 (!) 155/79   Pulse: 76 87   Resp: 22 16   Temp: 98.2 °F (36.8 °C)    TempSrc: Oral    SpO2: 97% 95%   Weight: 122.5 kg (270 lb)         Central Nervous System    GCS Initial 15 minutes   Eye  Motor  Verbal 4 - Opens eyes on own  6 - Follows simple motor commands  5 - Alert and oriented 4 - Opens eyes on own  6 - Follows simple motor commands  5 - Alert and oriented     Neuromuscular blockade: No  Pupil size:  Left 3 mm    Right 3 mm  Pupil reaction: Left pupil:  Yes        Right pupil:  Yes  Wiggles fingers: Left Yes Right Yes  Wiggles toes: Left Yes   Right Yes    Hand grasp:   Left  Present      Right  Present  Plantar flexion: Left  Present      Right   Present    History Obtained From:  Patient & EMS  Private Medical Doctor: Danyel Huffman MD    Medical History:  no    Surgical

## 2025-04-07 NOTE — DISCHARGE SUMMARY
Findings discussed with TAYE Farias 04/05/2025 at 8:59 p.m..     Result Date: 4/6/2025  EXAMINATION: CT OF THE HEAD WITHOUT CONTRAST  4/6/2025 8:23 pm TECHNIQUE: CT of the head was performed without the administration of intravenous contrast. Automated exposure control, iterative reconstruction, and/or weight based adjustment of the mA/kV was utilized to reduce the radiation dose to as low as reasonably achievable. COMPARISON: None. HISTORY: ORDERING SYSTEM PROVIDED HISTORY: injury TECHNOLOGIST PROVIDED HISTORY: Reason for exam:->injury Has a \"code stroke\" or \"stroke alert\" been called?->No Decision Support Exception - unselect if not a suspected or confirmed emergency medical condition->Emergency Medical Condition ([place]) FINDINGS: BRAIN/VENTRICLES: There is no acute intracranial hemorrhage, mass effect or midline shift.  No abnormal extra-axial 2 mm right mid and posterior parafalcine subdural hematoma.  High right convexity intraparenchymal hemorrhage..  The gray-white differentiation is maintained without evidence of an acute infarct.  There is no evidence of hydrocephalus. ORBITS: The visualized portion of the orbits demonstrate no acute abnormality. SINUSES: The visualized paranasal sinuses and mastoid air cells demonstrate no acute abnormality. SOFT TISSUES/SKULL:  No acute fractures.  Left posterior parietal scalp hematoma and laceration.     1. 2 mm right mid and posterior parafalcine subdural hematoma. 2. High right convexity intraparenchymal hemorrhage. 3. Left posterior parietal scalp hematoma and laceration.     CT CERVICAL SPINE WO CONTRAST  Result Date: 4/6/2025  EXAMINATION: CT OF THE CERVICAL SPINE WITHOUT CONTRAST 4/6/2025 8:23 pm TECHNIQUE: CT of the cervical spine was performed without the administration of intravenous contrast. Multiplanar reformatted images are provided for review. Automated exposure control, iterative reconstruction, and/or weight based adjustment of the mA/kV was utilized to  adjust your activities, depending on your job or school demands.       Rest and Sleep  Rest for the first 24 hours; it's one of the best things to help your brain recover.  It's okay to sleep if you want  You don't have to be woken up every few hours.  If someone does wake you up, you should awaken easily.    Do some light physical activity (housework) or light exercise (walking, stationary bike) as soon as you can tolerate the movement.  Strenuous exercise (such as jogging or weight lifting) can make your concussion symptoms worse or last longer.    Diet  Return to a normal diet as tolerated, you may want to start with liquids first  Eat healthy meals (including breakfast) and snacks throughout the day as your brain heals.    Managing Pain  Tylenol or Ibuprofen are the best meds to take for headaches.  Your doctor may have prescribed you medications if your headaches were severe or you have other injuries.  Please take as directed.  Driving  Your ability to concentrate and react quickly might be affected by the concussion.  Please contact trauma services for advice on when to resume driving.  Do not drive if you're concerned about vision problems, slowed thinking, slowed reaction time, reduced attention or poor judgment.  Wear sunglasses even during winter if roger while driving.  The bright light may induce a headache.     Alcohol use/Drug use  Don't drink alcohol or use recreational drugs as they may make you feel worse and/or hide the warning signs.        Follow-up:  Trauma Clinic: [phone] -- press option 2   Surgical/Trauma Clinic - [facility]  [address] [place] [address]  [id number]           Follow up:   Saad Ramos MD  [address] [id number] [phone]    Schedule an appointment as soon as possible for a visit in 2 week(s)  Hospital follow up    [facility]  [address] [place] [id number] [phone]  Schedule an

## 2025-04-08 NOTE — PROGRESS NOTES
PCP is Danyel Huffman MD  Office notified of admission.      Electronically signed by Mamie Dunn RN on 4/8/2025 at 8:20 AM      
4 Eyes Skin Assessment     NAME:  Kraig Magaña  YOB: 1970  MEDICAL RECORD NUMBER:  [id number]    The patient is being assessed for  Admission    I agree that at least one RN has performed a thorough Head to Toe Skin Assessment on the patient. ALL assessment sites listed below have been assessed.      Areas assessed by both nurses:    Head, Face, Ears, Shoulders, Back, Chest, Arms, Elbows, Hands, Sacrum. Buttock, Coccyx, Ischium, Legs. Feet and Heels, and Under Medical Devices         Does the Patient have a Wound? No noted wound(s)       Mat Prevention initiated by RN: No  Wound Care Orders initiated by RN: No    Pressure Injury (Stage 3,4, Unstageable, DTI, NWPT, and Complex wounds) if present, place Wound referral order by RN under : No    New Ostomies, if present place, Ostomy referral order under : No     Nurse 1 eSignature: Electronically signed by Joy Hollingsworth RN on 4/7/25 at 6:03 AM EDT    **SHARE this note so that the co-signing nurse can place an eSignature**    Nurse 2 eSignature: Electronically signed by Jaycee Monroy RN on 4/7/25 at 6:04 AM EDT   
OCCUPATIONAL THERAPY INITIAL EVALUATION AND DISCHARGE  [facility] [address] [place], [place]       Date:2025                                                  Patient Name: Kraig Magaña  MRN: [id number]  : 1970  Room: [address] [place] [address]-A    Evaluating OT: Haseeb Wetzel OTR/L [id number]    Referring Provider: Cristine Zavala MD      Specific Provider Orders/Date: OT evaluation and treatment 25 0515    Diagnosis:  Subdural hematoma (HCC) [S06.5XAA]  Intraparenchymal hemorrhage of brain (HCC) [I61.9]      Pertinent Medical History:  has no past medical history on file.     Pt presented to the hospital on  s/p fall and intoxication      Orders received, chart reviewed, eval complete.     Precautions: none       Assessment of current deficits: N/A    OT PLAN OF CARE   OT POC based on physician orders, patient diagnosis and results of clinical assessment    Frequency/Duration: N/A   Specific OT Treatment to include: N/A    Modified Azam Scale   Score     Description  0             No symptoms  1             No significant disability despite symptoms  2             Slight disability; able to look after own affairs  3             Moderate disability; able to ambulate without assist/ requires assist with ADLs  4             Moderate/Severe disability;requires assist to ambulate/assist with ADLs  5             Severe disability;bedridden/incontinent   6               Score:   0    Recommended Adaptive Equipment: no needs    Home Living:  Pt lives with spouse and 2 adult children   in a 1 story house with 2 steps to enter  and 1 hand rail    Bedroom setup: main level  standard bed   Bathroom setup: main level  tub/shower combination   Equipment owned:   crutches     Prior Level of Function:  Pt I with ADLs , I with IADLs, and completed functional mobility with no AD   Falls: yes   Driving: yes   Occupation/leisure: works full time - 
Patient state that he does not take any medications at home and he does not go to any specific pharmacy.   
Physical Therapy  Name: Kraig Magaña  : 1970  MRN: [id number]      Date of Service: 2025    Evaluating PT: Brad Gonzalez, PT, DPT [id number]      Room #:  8515/8515-A  Diagnosis:  Subdural hematoma (HCC) [S06.5XAA]  Intraparenchymal hemorrhage of brain (HCC) [I61.9]  PMHx/PSHx:  History reviewed. No pertinent past medical history.   Procedure/Surgery:  n/a  Precautions:  None   Equipment Needs:  n/a    SUBJECTIVE:    Pt lives with his wife and two adult children in a 1 story home with 2 stair(s) and 1 rail(s) to enter. Pt independent with all functional mobility prior. Pt ambulated with no AD prior to admission.    OBJECTIVE:   Initial Evaluation  Date: 25   AM-PAC 6 Clicks    Was pt agreeable to Eval/treatment? Yes    Does pt have pain? No pain reported   Bed Mobility  Rolling: Independent  Supine to sit: Independent  Sit to supine: Independent  Scooting: Independent   Transfers Sit to stand: Independent   Stand to sit: Independent   Stand pivot: Independent    Ambulation   250 feet with  Independent   Stair negotiation: ascended and descended 4 step(s) with 1 rail(s) Modified Independent   ROM BUE: WFL  BLE: WFL   Strength BUE: WFL  BLE: 5/5    Balance Sitting EOB: Independent   Dynamic Standing: Independent      Pt is A & O x: 4 to person, place, month/year, and situation.  Sensation: denies numbness and tingling   Edema: unremarkable     Patient education  Pt educated on PT role and safety with all mobility.     Patient response to education:   Pt verbalized understanding Pt demonstrated skill Pt requires further education in this area   Yes  yes no     ASSESSMENT:    Comments:    Pt was supine in bed upon room entry, agreeable to PT evaluation. Pt completed all mobility independently. Pt reports they are at functional baseline and that there are no skilled PT needs at this time. Pt educated to have PT services re-consulted if there is a change in mobility status. Pt was left in the 
Result   Unchanged small right [place] falcine and right posterior parasagittal frontal   overlying subdural hematoma. No mass effect or midline shift.         XR CHEST PORTABLE   Final Result   No acute cardiopulmonary process.         XR PELVIS (1-2 VIEWS)   Final Result   No fracture or dislocation.           PHYSICAL EXAM:     Central Nervous System  Loss of consciousness:  No    GCS:    Eye:  4 - Opens eyes on own  Motor:  6 - Follows simple motor commands  Verbal:  5 - Alert and oriented    Neuromuscular blockade: No  Pupil size:  Left 3 mm    Right 3 mm  Pupil reaction: Yes    Wiggles fingers: Left Yes Right Yes  Wiggles toes: Left Yes   Right Yes    Hand grasp:   Left  Present      Right  Present  Plantar flexion: Left  Present      Right   Present    PHYSICAL EXAM  General: No apparent distress, comfortable  HEENT: Trachea midline, no masses, Pupils equal round. Posterior scalp laceration with staples intact.   Chest: Respiratory effort was normal with no retractions or use of accessory muscles.   Cardiovascular: Extremities warm, well perfused  Abdomen:  Soft and non distended.  No tenderness, guarding, rebound, or rigidity  Extremities: Moves all 4 extremeties, No pedal edema    Spine:   Spine Tenderness ROM   Cervical 0/10 Normal   Thoracic 0/10 Normal   Lumbar 0/10 Normal     Musculoskeletal:    Joint Tenderness Swelling ROM   Right shoulder Absent absent normal   Left shoulder absent absent normal   Right elbow absent absent normal   Left elbow absent absent normal   Right wrist absent absent normal   Left wrist absent absent normal   Right hand grasp Absent absent normal   Left hand grasp absent absent normal   Right hip absent absent normal   Left hip absent absent normal   Right knee Absent absent normal   Left knee absent absent normal   Right ankle absent absent normal   Left ankle absent absent normal   Right foot Absent absent normal   Left foot absent absent normal     CONSULTS:

## 2025-04-14 NOTE — PROGRESS NOTES
[facility]  TRAUMA CLINIC PROGRESS NOTE  TRAUMA ADVANCED PRACTICE PRACTITIONER  4/15/2025    Date of injury: 04/07/2025       ELLIOT/Injuries: Trauma Follow-up.  Mechanism of injury:  Fall GLF    Chief Complaint   Patient presents with    Fall     New- Fall 4/6, -LOC, 2mm right subdural hematoma and a right intraparenchymal hemorrhage, posterior scalp laceration which was repaired with staples at outside facility. Pain where staples are located. Denies dizziness. Denies blurry or double vision. Denies difficultly with memory or word finding. Denies any drainage from staples. Denies increased swelling from area.      ELLIOT/Injuries:   Patient Active Problem List   Diagnosis Code    Subdural hematoma (HCC) S06.5XAA     Surgeries:   Past Surgical History:   Procedure Laterality Date    HERNIA REPAIR        Active Problems:    Patient Active Problem List   Diagnosis Code    Subdural hematoma (HCC) S06.5XAA     Vital signs:  BP (!) 145/89 (BP Site: Left Upper Arm, Patient Position: Sitting, BP Cuff Size: Large Adult)   Pulse 68   Temp 97.7 °F (36.5 °C) (Temporal)   Resp 16   Ht 1.803 m (5' 11\")   Wt 126.6 kg (279 lb)   SpO2 97%   BMI 38.91 kg/m²     Medications:    Prior to Admission medications    Not on File        S:  53 yo man sustained a fall in the shower hitting his head.  He endorsed drinking prior to the fall.  Injuries include: left posterior parietal scalp hematoma, right para falcine SDH & right intracranial hemorrhage.  He was discharged home.      Family member/caregiver attended appointment:  Yes: Comment: Son.         Have you had follow-up with your primary care physician since your trauma?    No  PCP   Dr. Huffman.      Review of Systems    Concussion screening:    Injury description  Evidence of forcible blow to head:   Yes  Evidence of intracranial injury or skull fracture:  Yes  Location of impact: Occipital  Retrograde amnesia before:  Are there any events just before the

## 2025-04-14 NOTE — PATIENT INSTRUCTIONS
[facility]  Trauma Services  Additional Discharge Instructions    Call [phone] for any questions/concerns.      Please follow the instructions checked below:  Please follow up with your primary care provider.       ACTIVITY INSTRUCTIONS  Increase activity as tolerated  No heavy lifting or strenuous activity  Take your incentive spirometer home and use 4-6 times/day   [x]  No driving until cleared by Dr. Ramos.      WOUND/DRESSING INSTRUCTIONS:  You may shower.  No sitting in bath tub, hot tub or swimming until cleared by physician.  Ice to areas of pain for first 24 hours.  Heat to areas of pain after that.    MEDICATION INSTRUCTIONS  Take medication as prescribed.  When taking pain medications, you may experience dizziness or drowsiness.  Do not drink alcohol or drive when taking these medications.  You may experience constipation while taking pain medication.  You may take over the counter stool softeners such as docusate (Colace), sennosides S (Senokot-S), or Miralax.   [x]  You may take Ibuprofen (over the counter) as directed for mild pain.  You may take up to 800 mg every 8 hours for pain, please take with food or milk.   [x]  You may take acetaminophen (Tylenol) products.  Do NOT take more than 4000mg of Tylenol in 24h.     CALL 911 OR YOUR LOCAL EMERGENCY SERVICE:  --If you take too much medication  --If you have trouble breathing or shortness of breath  --A child has taken this medication.    WORK: You may not return to work until you receive follow-up with Dr. Ramos.      Call the trauma clinic for any of the following or for questions/concerns;  --fever over 101F  --redness, swelling, hardness or warmth at the wound site(s).  --Unrelieved nausea/vomiting  --Foul smelling or cloudy drainage at the wound site(s)  --Unrelieved pain or increase in pain  --Increase in shortness of breath    Follow-up:  Trauma Clinic: [phone]--press option 2  Surgical/Trauma

## 2025-04-15 ENCOUNTER — OFFICE VISIT (OUTPATIENT)
Dept: SURGERY | Age: 55
End: 2025-04-15
Payer: COMMERCIAL

## 2025-04-15 VITALS
HEIGHT: 71 IN | WEIGHT: 279 LBS | TEMPERATURE: 97.7 F | HEART RATE: 68 BPM | OXYGEN SATURATION: 97 % | RESPIRATION RATE: 16 BRPM | BODY MASS INDEX: 39.06 KG/M2 | DIASTOLIC BLOOD PRESSURE: 89 MMHG | SYSTOLIC BLOOD PRESSURE: 145 MMHG
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
## 2025-04-15 DIAGNOSIS — S06.5XAA SDH (SUBDURAL HEMATOMA) (HCC): ICD-10-CM

## 2025-04-15 PROCEDURE — 99212 OFFICE O/P EST SF 10 MIN: CPT

## 2025-04-15 PROCEDURE — 99213 OFFICE O/P EST LOW 20 MIN: CPT | Performed by: NURSE PRACTITIONER

## 2025-05-06 ENCOUNTER — HOSPITAL ENCOUNTER (OUTPATIENT)
Dept: CT IMAGING | Age: 55
Discharge: HOME | End: 2025-05-08
Attending: NEUROLOGICAL SURGERY
Payer: COMMERCIAL

## 2025-05-06 DIAGNOSIS — S06.5XAA SUBDURAL HEMATOMA (HCC): ICD-10-CM

## 2025-05-06 PROCEDURE — 70450 CT HEAD/BRAIN W/O DYE: CPT

## 2025-08-02 ENCOUNTER — HOSPITAL ENCOUNTER (EMERGENCY)
Age: 55
Discharge: HOME | End: 2025-08-02
Payer: COMMERCIAL

## 2025-08-02 VITALS
TEMPERATURE: 98.6 F | BODY MASS INDEX: 36.26 KG/M2 | WEIGHT: 260 LBS | SYSTOLIC BLOOD PRESSURE: 139 MMHG | HEART RATE: 63 BPM | RESPIRATION RATE: 18 BRPM | DIASTOLIC BLOOD PRESSURE: 82 MMHG | OXYGEN SATURATION: 100 %

## 2025-08-02 DIAGNOSIS — S29.012A RHOMBOID MUSCLE STRAIN, INITIAL ENCOUNTER: Primary | ICD-10-CM

## 2025-08-02 PROCEDURE — 99211 OFF/OP EST MAY X REQ PHY/QHP: CPT

## 2025-08-02 ASSESSMENT — PAIN DESCRIPTION - DESCRIPTORS: DESCRIPTORS: ACHING;SPASM;SQUEEZING

## 2025-08-02 ASSESSMENT — PAIN DESCRIPTION - PAIN TYPE: TYPE: ACUTE PAIN

## 2025-08-02 ASSESSMENT — PAIN DESCRIPTION - LOCATION: LOCATION: BACK

## 2025-08-02 ASSESSMENT — PAIN SCALES - GENERAL: PAINLEVEL_OUTOF10: 8

## 2025-08-02 ASSESSMENT — PAIN - FUNCTIONAL ASSESSMENT: PAIN_FUNCTIONAL_ASSESSMENT: 0-10

## 2025-08-02 NOTE — ED PROVIDER NOTES
[facility] URGENT CARE     NAME: Kraig Magaña  :  1970  MRN:  [id number]  Date of evaluation: 2025  Provider: Feliciano Hernandez PA-C  PCP: Danyel Huffman MD  Note Started : 8:38 AM EDT 25    Chief Complaint: Back Pain (Upper back pain, between shoulders. Started this AM, took Aleve did not help)      This is a 54-year-old male that presents to urgent care complaining of right-sided rhomboid area back pain.  He does state that this morning before work he got up and he wanted to reinstall a rechargeable security camera at home and instead of climbing up a ladder he he bent over and picked up a log and moved it to the area.  He states once he picked up the log and bent over to position it he felt pain in his back.  He denies any radiation of the pain.  No chest pain or shortness of breath.  No neck pain no weakness or numbness.  No headache or blurred vision or lightheadedness or dizziness.  No lower back pain no abdominal pain.  He states he took Aleve and went to work.  He states that he had to leave because of back spasms.  On first contact with patient he appears to be in no acute distress.        Review of Systems  Pertinent positives and negatives are stated within HPI, all other systems reviewed and are negative.     Allergies: Patient has no known allergies.     --------------------------------------------- PAST HISTORY ---------------------------------------------  Past Medical History:  has a past medical history of Head injury.    Past Surgical History:  has a past surgical history that includes hernia repair and other surgical history.    Social History:  reports that he has never smoked. He has never used smokeless tobacco. He reports current alcohol use. He reports that he does not use drugs.    Family History: family history is not on file.     The patient’s home medications have been reviewed.    The nursing notes within the ED encounter have been reviewed.